# Patient Record
Sex: MALE | Race: OTHER
[De-identification: names, ages, dates, MRNs, and addresses within clinical notes are randomized per-mention and may not be internally consistent; named-entity substitution may affect disease eponyms.]

---

## 2021-12-01 ENCOUNTER — HOSPITAL ENCOUNTER (EMERGENCY)
Dept: HOSPITAL 12 - ER | Age: 26
Discharge: HOME | End: 2021-12-01
Payer: MEDICAID

## 2021-12-01 VITALS — WEIGHT: 150 LBS | BODY MASS INDEX: 25.61 KG/M2 | HEIGHT: 64 IN

## 2021-12-01 VITALS — DIASTOLIC BLOOD PRESSURE: 76 MMHG | SYSTOLIC BLOOD PRESSURE: 127 MMHG
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
## 2021-12-01 DIAGNOSIS — S62.522B: Primary | ICD-10-CM
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
## 2021-12-01 PROCEDURE — A4217 STERILE WATER/SALINE, 500 ML: HCPCS

## 2021-12-01 PROCEDURE — 11760 REPAIR OF NAIL BED: CPT

## 2021-12-01 PROCEDURE — 99284 EMERGENCY DEPT VISIT MOD MDM: CPT

## 2021-12-01 PROCEDURE — 96365 THER/PROPH/DIAG IV INF INIT: CPT

## 2021-12-01 PROCEDURE — 96375 TX/PRO/DX INJ NEW DRUG ADDON: CPT

## 2021-12-01 PROCEDURE — A4663 DIALYSIS BLOOD PRESSURE CUFF: HCPCS

## 2021-12-01 PROCEDURE — 73140 X-RAY EXAM OF FINGER(S): CPT

## 2021-12-01 RX ADMIN — HYDROMORPHONE HYDROCHLORIDE ONE MG: 1 INJECTION, SOLUTION INTRAMUSCULAR; INTRAVENOUS; SUBCUTANEOUS at 12:10

## 2021-12-01 RX ADMIN — BACITRACIN ZINC, NEOMYCIN, POLYMYXIN B ONE PKT: 400; 3.5; 5 OINTMENT TOPICAL at 15:36

## 2021-12-01 RX ADMIN — SODIUM CHLORIDE ONE MG: 9 INJECTION, SOLUTION INTRAVENOUS at 12:10

## 2021-12-01 RX ADMIN — TETANUS TOXOID, REDUCED DIPHTHERIA TOXOID AND ACELLULAR PERTUSSIS VACCINE, ADSORBED ONE ML: 5; 2.5; 8; 8; 2.5 SUSPENSION INTRAMUSCULAR at 12:55

## 2021-12-01 RX ADMIN — LIDOCAINE HYDROCHLORIDE ONE ML: 10 INJECTION, SOLUTION INFILTRATION; PERINEURAL at 14:12

## 2021-12-01 RX ADMIN — DEXTROSE ONE MLS/HR: 50 INJECTION, SOLUTION INTRAVENOUS at 12:20

## 2021-12-01 NOTE — NUR
Patient discharged to home in stable condition. Written and verbal after care 
instructions given. Recommended pt not drive due to administration of narcotic 
medication. 

Patient verbalizes understanding of instructions. Stressed follow up or return 
to ER for worsening s/s.

## 2021-12-01 NOTE — NUR
Wound was cleaned with saline and gauze. Dressed with triple ABX, gauze bulky 
dressing and pt was fitted with velcro thumb spica, per provider order. Genevieve 
pt on proper use and wear of orthopedic appliance. Pt states understanding 
information/instructions provided.

## 2021-12-03 ENCOUNTER — HOSPITAL ENCOUNTER (EMERGENCY)
Dept: HOSPITAL 12 - ER | Age: 26
Discharge: HOME | End: 2021-12-03
Payer: MEDICAID

## 2021-12-03 VITALS — WEIGHT: 150 LBS | BODY MASS INDEX: 25.61 KG/M2 | HEIGHT: 64 IN

## 2021-12-03 VITALS — SYSTOLIC BLOOD PRESSURE: 117 MMHG | DIASTOLIC BLOOD PRESSURE: 70 MMHG

## 2021-12-03 DIAGNOSIS — S62.522D: Primary | ICD-10-CM
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
## 2021-12-03 PROCEDURE — 99283 EMERGENCY DEPT VISIT LOW MDM: CPT

## 2021-12-03 PROCEDURE — A4663 DIALYSIS BLOOD PRESSURE CUFF: HCPCS

## 2021-12-03 PROCEDURE — 96372 THER/PROPH/DIAG INJ SC/IM: CPT

## 2021-12-03 PROCEDURE — A4217 STERILE WATER/SALINE, 500 ML: HCPCS

## 2021-12-03 PROCEDURE — 29125 APPL SHORT ARM SPLINT STATIC: CPT

## 2021-12-03 NOTE — NUR
Patient discharged to home in stable condition.  Written and verbal after care 
instructions given. 

Patient verbalizes understanding of instructions. Stressed follow up or return 
to ER for worsening s/s.

Patient out of ER with steady gait, no acute signs of distress, VSS, all 
belongings taken, instructed not to drive, to be driven home by wife via 
private vehicle.

## 2021-12-03 NOTE — NUR
Pt here on Wednesday for left thumb lac.  Wound wrapped in roller gauze and 
hand/wrist splint in place.  Pt in extreme pain when trying to remove splint 
(sticking to gauze).  Softening bandage w/saline.  Will try removing again.

## 2021-12-11 ENCOUNTER — HOSPITAL ENCOUNTER (EMERGENCY)
Dept: HOSPITAL 12 - ER | Age: 26
Discharge: HOME | End: 2021-12-11
Payer: MEDICAID

## 2021-12-11 VITALS — HEIGHT: 62 IN | BODY MASS INDEX: 27.6 KG/M2 | WEIGHT: 150 LBS

## 2021-12-11 DIAGNOSIS — S61.012D: Primary | ICD-10-CM
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
## 2021-12-11 PROCEDURE — A4663 DIALYSIS BLOOD PRESSURE CUFF: HCPCS

## 2021-12-11 NOTE — NUR
Patient discharged to home in stable condition with brisk steady gait.  Written 
and verbal after care instructions given to patient. Patient verbalized 
understandingand compliance of instructions. Stressed follow up with primary 
doctor or return to ER for worsening s/s.